# Patient Record
Sex: MALE | Race: WHITE | NOT HISPANIC OR LATINO
[De-identification: names, ages, dates, MRNs, and addresses within clinical notes are randomized per-mention and may not be internally consistent; named-entity substitution may affect disease eponyms.]

---

## 2017-09-27 PROBLEM — Z00.00 ENCOUNTER FOR PREVENTIVE HEALTH EXAMINATION: Status: ACTIVE | Noted: 2017-09-27

## 2017-09-29 ENCOUNTER — RECORD ABSTRACTING (OUTPATIENT)
Age: 57
End: 2017-09-29

## 2017-09-29 ENCOUNTER — APPOINTMENT (OUTPATIENT)
Dept: NEUROLOGY | Facility: CLINIC | Age: 57
End: 2017-09-29
Payer: COMMERCIAL

## 2017-09-29 VITALS
DIASTOLIC BLOOD PRESSURE: 72 MMHG | HEIGHT: 73 IN | BODY MASS INDEX: 40.56 KG/M2 | HEART RATE: 66 BPM | WEIGHT: 306 LBS | SYSTOLIC BLOOD PRESSURE: 124 MMHG

## 2017-09-29 VITALS — RESPIRATION RATE: 16 BRPM

## 2017-09-29 DIAGNOSIS — Z87.09 PERSONAL HISTORY OF OTHER DISEASES OF THE RESPIRATORY SYSTEM: ICD-10-CM

## 2017-09-29 DIAGNOSIS — R51 HEADACHE: ICD-10-CM

## 2017-09-29 DIAGNOSIS — Z80.9 FAMILY HISTORY OF MALIGNANT NEOPLASM, UNSPECIFIED: ICD-10-CM

## 2017-09-29 DIAGNOSIS — Z87.39 PERSONAL HISTORY OF OTHER DISEASES OF THE MUSCULOSKELETAL SYSTEM AND CONNECTIVE TISSUE: ICD-10-CM

## 2017-09-29 DIAGNOSIS — Z86.39 PERSONAL HISTORY OF OTHER ENDOCRINE, NUTRITIONAL AND METABOLIC DISEASE: ICD-10-CM

## 2017-09-29 DIAGNOSIS — Z86.69 PERSONAL HISTORY OF OTHER DISEASES OF THE NERVOUS SYSTEM AND SENSE ORGANS: ICD-10-CM

## 2017-09-29 DIAGNOSIS — Z87.891 PERSONAL HISTORY OF NICOTINE DEPENDENCE: ICD-10-CM

## 2017-09-29 DIAGNOSIS — M79.671 PAIN IN RIGHT FOOT: ICD-10-CM

## 2017-09-29 DIAGNOSIS — Z82.49 FAMILY HISTORY OF ISCHEMIC HEART DISEASE AND OTHER DISEASES OF THE CIRCULATORY SYSTEM: ICD-10-CM

## 2017-09-29 PROCEDURE — 99204 OFFICE O/P NEW MOD 45 MIN: CPT

## 2017-09-29 RX ORDER — ALBUTEROL SULFATE 90 UG/1
108 (90 BASE) AEROSOL, METERED RESPIRATORY (INHALATION)
Qty: 18 | Refills: 0 | Status: ACTIVE | COMMUNITY
Start: 2017-08-23

## 2017-09-29 RX ORDER — BUDESONIDE AND FORMOTEROL FUMARATE DIHYDRATE 160; 4.5 UG/1; UG/1
160-4.5 AEROSOL RESPIRATORY (INHALATION)
Qty: 10 | Refills: 0 | Status: ACTIVE | COMMUNITY
Start: 2017-02-28

## 2017-09-29 RX ORDER — HYDROCODONE BITARTRATE AND HOMATROPINE METHYLBROMIDE 5; 1.5 MG/5ML; MG/5ML
5-1.5 SYRUP ORAL
Qty: 120 | Refills: 0 | Status: COMPLETED | COMMUNITY
Start: 2017-07-25

## 2017-09-29 RX ORDER — CEPHALEXIN 500 MG/1
500 CAPSULE ORAL
Qty: 28 | Refills: 0 | Status: COMPLETED | COMMUNITY
Start: 2017-05-19

## 2017-11-03 ENCOUNTER — APPOINTMENT (OUTPATIENT)
Dept: NEUROLOGY | Facility: CLINIC | Age: 57
End: 2017-11-03

## 2017-11-13 ENCOUNTER — APPOINTMENT (OUTPATIENT)
Dept: MRI IMAGING | Facility: CLINIC | Age: 57
End: 2017-11-13
Payer: COMMERCIAL

## 2017-11-13 ENCOUNTER — OUTPATIENT (OUTPATIENT)
Dept: OUTPATIENT SERVICES | Facility: HOSPITAL | Age: 57
LOS: 1 days | End: 2017-11-13
Payer: COMMERCIAL

## 2017-11-13 DIAGNOSIS — R26.81 UNSTEADINESS ON FEET: ICD-10-CM

## 2017-11-13 PROCEDURE — 70551 MRI BRAIN STEM W/O DYE: CPT | Mod: 26

## 2017-11-13 PROCEDURE — 70551 MRI BRAIN STEM W/O DYE: CPT

## 2017-11-20 ENCOUNTER — APPOINTMENT (OUTPATIENT)
Dept: MRI IMAGING | Facility: CLINIC | Age: 57
End: 2017-11-20
Payer: COMMERCIAL

## 2017-11-20 ENCOUNTER — OUTPATIENT (OUTPATIENT)
Dept: OUTPATIENT SERVICES | Facility: HOSPITAL | Age: 57
LOS: 1 days | End: 2017-11-20
Payer: COMMERCIAL

## 2017-11-20 DIAGNOSIS — Z00.8 ENCOUNTER FOR OTHER GENERAL EXAMINATION: ICD-10-CM

## 2017-11-20 PROCEDURE — 72148 MRI LUMBAR SPINE W/O DYE: CPT | Mod: 26

## 2017-11-20 PROCEDURE — 72148 MRI LUMBAR SPINE W/O DYE: CPT

## 2017-11-20 PROCEDURE — 72141 MRI NECK SPINE W/O DYE: CPT

## 2017-11-20 PROCEDURE — 72141 MRI NECK SPINE W/O DYE: CPT | Mod: 26

## 2017-11-24 ENCOUNTER — OTHER (OUTPATIENT)
Age: 57
End: 2017-11-24

## 2017-12-28 ENCOUNTER — APPOINTMENT (OUTPATIENT)
Dept: NEUROLOGY | Facility: CLINIC | Age: 57
End: 2017-12-28

## 2018-01-05 ENCOUNTER — OUTPATIENT (OUTPATIENT)
Dept: OUTPATIENT SERVICES | Facility: HOSPITAL | Age: 58
LOS: 1 days | End: 2018-01-05
Payer: COMMERCIAL

## 2018-01-05 ENCOUNTER — APPOINTMENT (OUTPATIENT)
Dept: MRI IMAGING | Facility: CLINIC | Age: 58
End: 2018-01-05
Payer: COMMERCIAL

## 2018-01-05 DIAGNOSIS — Z00.8 ENCOUNTER FOR OTHER GENERAL EXAMINATION: ICD-10-CM

## 2018-01-05 PROCEDURE — A9585: CPT

## 2018-01-05 PROCEDURE — 70553 MRI BRAIN STEM W/O & W/DYE: CPT | Mod: 26

## 2018-01-05 PROCEDURE — 70553 MRI BRAIN STEM W/O & W/DYE: CPT

## 2018-02-16 ENCOUNTER — APPOINTMENT (OUTPATIENT)
Dept: NEUROLOGY | Facility: CLINIC | Age: 58
End: 2018-02-16
Payer: COMMERCIAL

## 2018-02-16 PROCEDURE — 95911 NRV CNDJ TEST 9-10 STUDIES: CPT

## 2018-02-16 PROCEDURE — 95886 MUSC TEST DONE W/N TEST COMP: CPT

## 2018-02-16 RX ORDER — SODIUM FLUORIDE 6 MG/ML
1.1 PASTE, DENTIFRICE DENTAL
Qty: 100 | Refills: 0 | Status: COMPLETED | COMMUNITY
Start: 2017-10-28

## 2018-02-16 RX ORDER — AMOXICILLIN 500 MG/1
500 CAPSULE ORAL
Qty: 21 | Refills: 0 | Status: COMPLETED | COMMUNITY
Start: 2017-10-28

## 2018-02-16 RX ORDER — AMOXICILLIN AND CLAVULANATE POTASSIUM 875; 125 MG/1; MG/1
875-125 TABLET, COATED ORAL
Qty: 20 | Refills: 0 | Status: COMPLETED | COMMUNITY
Start: 2018-02-01

## 2018-02-16 RX ORDER — METHYLPREDNISOLONE 4 MG/1
4 TABLET ORAL
Qty: 21 | Refills: 0 | Status: COMPLETED | COMMUNITY
Start: 2018-02-01

## 2018-03-19 ENCOUNTER — APPOINTMENT (OUTPATIENT)
Dept: NEUROLOGY | Facility: CLINIC | Age: 58
End: 2018-03-19
Payer: COMMERCIAL

## 2018-03-19 VITALS
WEIGHT: 304 LBS | BODY MASS INDEX: 40.29 KG/M2 | SYSTOLIC BLOOD PRESSURE: 106 MMHG | HEIGHT: 73 IN | HEART RATE: 72 BPM | DIASTOLIC BLOOD PRESSURE: 72 MMHG

## 2018-03-19 DIAGNOSIS — R20.0 ANESTHESIA OF SKIN: ICD-10-CM

## 2018-03-19 DIAGNOSIS — R20.2 ANESTHESIA OF SKIN: ICD-10-CM

## 2018-03-19 DIAGNOSIS — S16.1XXA STRAIN OF MUSCLE, FASCIA AND TENDON AT NECK LEVEL, INITIAL ENCOUNTER: ICD-10-CM

## 2018-03-19 DIAGNOSIS — G43.809 OTHER MIGRAINE, NOT INTRACTABLE, W/OUT STATUS MIGRAINOSUS: ICD-10-CM

## 2018-03-19 DIAGNOSIS — G89.29 LOW BACK PAIN: ICD-10-CM

## 2018-03-19 DIAGNOSIS — R90.89 OTHER ABNORMAL FINDINGS ON DIAGNOSTIC IMAGING OF CENTRAL NERVOUS SYSTEM: ICD-10-CM

## 2018-03-19 DIAGNOSIS — M54.5 LOW BACK PAIN: ICD-10-CM

## 2018-03-19 DIAGNOSIS — M54.41 LUMBAGO WITH SCIATICA, RIGHT SIDE: ICD-10-CM

## 2018-03-19 DIAGNOSIS — E23.7 DISORDER OF PITUITARY GLAND, UNSPECIFIED: ICD-10-CM

## 2018-03-19 DIAGNOSIS — M54.16 RADICULOPATHY, LUMBAR REGION: ICD-10-CM

## 2018-03-19 DIAGNOSIS — R26.81 UNSTEADINESS ON FEET: ICD-10-CM

## 2018-03-19 PROCEDURE — 99214 OFFICE O/P EST MOD 30 MIN: CPT

## 2018-05-25 ENCOUNTER — MOBILE ON CALL (OUTPATIENT)
Age: 58
End: 2018-05-25

## 2018-06-14 ENCOUNTER — APPOINTMENT (OUTPATIENT)
Dept: NEUROLOGY | Facility: CLINIC | Age: 58
End: 2018-06-14

## 2019-09-19 NOTE — H&P ADULT - HISTORY OF PRESENT ILLNESS
60 yo M with PMHx of arthritis, sleep apnea, had episode of chest pressure which was reproducible, diagnosed with costochondritis recently.  Pt underwent stress test which showed (+) distal IW ischemia.  Pt referred for LHC with possible intervention. 58 yo M with PMHx of arthritis, sleep apnea, had episode of chest pressure which was reproducible, diagnosed with costochondritis recently. Had a prior episode of chest pain after stressful event at work  Pt underwent stress test which showed (+) distal IW ischemia.  Pt referred for LHC with possible intervention. 58 yo M with PMHx of arthritis, sleep apnea, had episode of chest pressure which was reproducible, diagnosed with costochondritis recently. Had  prior episodes of chest pain after stressful event at work and walking.   Pt underwent stress test which showed (+) distal IW ischemia.  Pt referred for LHC with possible intervention.

## 2019-09-19 NOTE — H&P ADULT - ASSESSMENT
58 yo M with PMHx of arthritis, sleep apnea, had episode of chest pressure which was reproducible, diagnosed with costochondritis recently.  Pt underwent stress test which showed (+) distal IW ischemia.  Pt referred for LHC with possible intervention.  ASA:  Calculated bleeding risk score:  Creatinine:  GFR: 58 yo M with PMHx of arthritis, sleep apnea, had episode of chest pressure which was reproducible, diagnosed with costochondritis recently.  Pt underwent stress test which showed (+) distal IW ischemia.  Pt referred for LHC with possible intervention.  ASA:II  Calculated bleeding risk score:0.7%  Creatinine:0.86  GFR:96 60 yo M with PMHx of arthritis, sleep apnea, had episode of chest pressure which was reproducible, diagnosed with costochondritis recently.  Pt underwent stress test which showed (+) distal IW ischemia.  Pt referred for LHC with possible intervention.  Hepatitis C drawn per guideline. Pt will follow with PCP  ASA:II  Calculated bleeding risk score:0.7%  Creatinine:0.86  GFR:96 60 yo M with PMHx of arthritis, sleep apnea, had episode of chest pressure which was reproducible, diagnosed with costochondritis recently. Had a prior episode of chest pain after stressful event at work    Pt underwent stress test which showed (+) distal IW ischemia.  Pt referred for LHC with possible intervention.  Hepatitis C drawn per guideline. Pt will follow with PCP  ASA:II  Calculated bleeding risk score:0.7%  Creatinine:0.86  GFR:96 60 yo M with PMHx of arthritis, sleep apnea, had episode of chest pressure which was reproducible, diagnosed with costochondritis recently. Had prior episodes of chest pain after stressful event at work and walking   Pt underwent stress test which showed (+) distal IW ischemia.  Pt referred for LHC with possible intervention.  Hepatitis C drawn per guideline. Pt will follow with PCP  ASA:II  Calculated bleeding risk score:0.7%  Creatinine:0.86  GFR:96

## 2019-09-19 NOTE — H&P ADULT - PROBLEM SELECTOR PLAN 1
Pt is referred for Lt heart cath/possible PCI. Labs & medications are reviewed. Informed consent obtained after discussion of [facility] risks, benefits and alternatives  with patient. Risk discussed included, but not limited to MI, stroke, mortality, major bleeding, arrhythmia, or infection.  An educational material provided. Pt. verbalizes understandings of pre-procedural instructions.

## 2019-09-19 NOTE — ASU PATIENT PROFILE, ADULT - REASON FOR ADMISSION, PROFILE
I've been getting more and mores short of breath recently and had chest pain so they sent me here for a heart cath

## 2019-09-19 NOTE — H&P ADULT - NSHPREVIEWOFSYSTEMS_GEN_ALL_CORE
REVIEW OF SYSTEMS:    CONSTITUTIONAL: No weakness, fevers or chills  EYES/ENT: No visual changes;  No vertigo or throat pain   NECK: No pain or stiffness  RESPIRATORY: No cough, wheezing, hemoptysis; No shortness of breath  CARDIOVASCULAR: + chest pain. Denies palpitations  GASTROINTESTINAL: No abdominal or epigastric pain. No nausea, vomiting, or hematemesis; No diarrhea or constipation. No melena or hematochezia.  GENITOURINARY: No dysuria, frequency or hematuria  NEUROLOGICAL: No numbness or weakness  SKIN: No itching, burning, rashes, or lesions   All other review of systems is negative unless indicated above.

## 2019-09-19 NOTE — H&P ADULT - NSICDXFAMILYHX_GEN_ALL_CORE_FT
FAMILY HISTORY:  No pertinent family history in first degree relatives FAMILY HISTORY:  FHx: heart disease, father

## 2019-09-19 NOTE — H&P ADULT - NSHPPHYSICALEXAM_GEN_ALL_CORE
PHYSICAL EXAM:    Constitutional: NAD, well-groomed, well-developed  Neuro: Alert and oriented x 3 Gait steady Speech clear No focal deficits  Neck: No JVD No bruit  Respiratory: CTAB  Cardiovascular: S1 and S2, RRR,   Gastrointestinal: BS+, soft, NT/ND  Extremities: No clubbing cyanosis or edema No varicosities  Vascular: 2+ peripheral pulses  Psychiatric: Normal mood, normal affect  Musculoskeletal: 5/5 strength b/l upper and lower extremities PHYSICAL EXAM:    Vital Signs Last 24 Hrs  T(C): 35.9 (20 Sep 2019 07:51), Max: 35.9 (20 Sep 2019 07:51)  T(F): 96.7 (20 Sep 2019 07:51), Max: 96.7 (20 Sep 2019 07:51)  HR: 68 (20 Sep 2019 07:51) (68 - 68)  BP: 120/81 (20 Sep 2019 07:51) (120/81 - 120/81)  RR: 16 (20 Sep 2019 07:51) (16 - 16)  SpO2: 95% (20 Sep 2019 07:51) (95% - 95%)    Constitutional: NAD, well-groomed, well-developed  Neuro: Alert and oriented x 3 Gait steady Speech clear No focal deficits  Neck: No JVD No bruit  Respiratory: CTAB  Cardiovascular: S1 and S2, RRR,   Gastrointestinal: BS+, soft, NT/ND  Extremities: No clubbing cyanosis or edema No varicosities  Vascular: 2+ peripheral pulses  Psychiatric: Normal mood, normal affect  Musculoskeletal: 5/5 strength b/l upper and lower extremities

## 2019-09-20 ENCOUNTER — OUTPATIENT (OUTPATIENT)
Dept: OUTPATIENT SERVICES | Facility: HOSPITAL | Age: 59
LOS: 1 days | Discharge: HOME | End: 2019-09-20
Payer: COMMERCIAL

## 2019-09-20 VITALS
HEART RATE: 68 BPM | TEMPERATURE: 97 F | OXYGEN SATURATION: 95 % | DIASTOLIC BLOOD PRESSURE: 81 MMHG | RESPIRATION RATE: 16 BRPM | SYSTOLIC BLOOD PRESSURE: 120 MMHG

## 2019-09-20 VITALS
OXYGEN SATURATION: 99 % | RESPIRATION RATE: 18 BRPM | SYSTOLIC BLOOD PRESSURE: 123 MMHG | DIASTOLIC BLOOD PRESSURE: 70 MMHG | HEART RATE: 68 BPM

## 2019-09-20 DIAGNOSIS — R07.2 PRECORDIAL PAIN: ICD-10-CM

## 2019-09-20 DIAGNOSIS — R94.39 ABNORMAL RESULT OF OTHER CARDIOVASCULAR FUNCTION STUDY: ICD-10-CM

## 2019-09-20 LAB
HCV AB S/CO SERPL IA: 0.09 S/CO — SIGNIFICANT CHANGE UP (ref 0–0.99)
HCV AB SERPL-IMP: SIGNIFICANT CHANGE UP

## 2019-09-20 PROCEDURE — C1887: CPT

## 2019-09-20 PROCEDURE — C1769: CPT

## 2019-09-20 PROCEDURE — C1760: CPT

## 2019-09-20 PROCEDURE — C1894: CPT

## 2019-09-20 PROCEDURE — 86803 HEPATITIS C AB TEST: CPT

## 2019-09-20 PROCEDURE — 93458 L HRT ARTERY/VENTRICLE ANGIO: CPT

## 2019-09-20 PROCEDURE — 36415 COLL VENOUS BLD VENIPUNCTURE: CPT

## 2019-09-20 RX ORDER — ASPIRIN/CALCIUM CARB/MAGNESIUM 324 MG
1 TABLET ORAL
Qty: 0 | Refills: 0 | DISCHARGE

## 2019-09-20 RX ORDER — BUDESONIDE AND FORMOTEROL FUMARATE DIHYDRATE 160; 4.5 UG/1; UG/1
2 AEROSOL RESPIRATORY (INHALATION)
Qty: 0 | Refills: 0 | DISCHARGE

## 2019-09-20 NOTE — PACU DISCHARGE NOTE - COMMENTS
Pt. and wife verb. agreement and understanding to and teach back to written and verbal discharge instructions and medication list and MD follow up.  Pt. discharged to home via private auto accompanied by wife; escorted to auto via w/c by transport tech.

## 2019-09-20 NOTE — PROGRESS NOTE ADULT - SUBJECTIVE AND OBJECTIVE BOX
s/p LHC revealing non obstructive CAD.  Denies chest pain, shortness of breath, dizziness or palpitations at this time    Right groin procedure site CDI.  no bleeding, no hematoma, site soft, non tender, positive pedal pulses bilaterally         HPI:  60 yo M with PMHx of arthritis, sleep apnea, had episode of chest pressure which was reproducible, diagnosed with costochondritis recently.  Pt underwent stress test which showed (+) distal IW ischemia.  Pt referred for LHC with possible intervention. (19 Sep 2019 14:56)    now s/p LHC revealing non obstructive CAD    --vital signs, labs, diet and activity per post procedure orders  -ambulte ad geeta post bedrest  -iv hydration  -encourage PO fluids  -plan of care discussed with patient, family and MD  -d/c after bedrest if stable  -post procedure and d/c instructions reviewed  -follow up with MD in 1-2 weeks  -Discussed therapeutic lifestyle changes to reduce risk factors such as following a cardiac diet, weight loss, maintaining a healthy weight, exercise, smoking cessation, medication compliance, and regular follow-up  with MD to know your numbers (BP, cholesterol, weight, and glucose) s/p LHC revealing non obstructive CAD.  Denies chest pain, shortness of breath, dizziness or palpitations at this time    A+Ox3  CV:S1S2  Respiratory:CTAB  Right groin procedure site CDI.  no bleeding, no hematoma, site soft, non tender, positive pedal pulses bilaterally         HPI:  58 yo M with PMHx of arthritis, sleep apnea, had episode of chest pressure which was reproducible, diagnosed with costochondritis recently. Had prior  episode of chest pain after stressful event at work   Pt underwent stress test which showed (+) distal IW ischemia.  Pt referred for LHC with possible intervention. (19 Sep 2019 14:56)    now s/p LHC revealing non obstructive CAD    --vital signs, diet and activity per post procedure orders  -ambulate ad geeta post bedrest  -encourage PO fluids  -plan of care discussed with patient, family and MD  -d/c after bedrest if stable  -post procedure and d/c instructions reviewed  -follow up with MD in 1-2 weeks  -Discussed therapeutic lifestyle changes to reduce risk factors such as following a cardiac diet, weight loss, maintaining a healthy weight, exercise, smoking cessation, medication compliance, and regular follow-up  with MD

## 2019-09-22 ENCOUNTER — TRANSCRIPTION ENCOUNTER (OUTPATIENT)
Age: 59
End: 2019-09-22

## 2019-09-25 DIAGNOSIS — R94.39 ABNORMAL RESULT OF OTHER CARDIOVASCULAR FUNCTION STUDY: ICD-10-CM

## 2019-09-25 DIAGNOSIS — E78.00 PURE HYPERCHOLESTEROLEMIA, UNSPECIFIED: ICD-10-CM

## 2019-09-25 DIAGNOSIS — I20.8 OTHER FORMS OF ANGINA PECTORIS: ICD-10-CM

## 2019-09-25 DIAGNOSIS — I10 ESSENTIAL (PRIMARY) HYPERTENSION: ICD-10-CM

## 2019-12-09 ENCOUNTER — LABORATORY RESULT (OUTPATIENT)
Age: 59
End: 2019-12-09

## 2019-12-09 ENCOUNTER — APPOINTMENT (OUTPATIENT)
Dept: DERMATOLOGY | Facility: CLINIC | Age: 59
End: 2019-12-09
Payer: COMMERCIAL

## 2019-12-09 DIAGNOSIS — D22.9 MELANOCYTIC NEVI, UNSPECIFIED: ICD-10-CM

## 2019-12-09 DIAGNOSIS — L91.8 OTHER HYPERTROPHIC DISORDERS OF THE SKIN: ICD-10-CM

## 2019-12-09 DIAGNOSIS — D48.9 NEOPLASM OF UNCERTAIN BEHAVIOR, UNSPECIFIED: ICD-10-CM

## 2019-12-09 DIAGNOSIS — Z12.83 ENCOUNTER FOR SCREENING FOR MALIGNANT NEOPLASM OF SKIN: ICD-10-CM

## 2019-12-09 PROCEDURE — 99243 OFF/OP CNSLTJ NEW/EST LOW 30: CPT | Mod: 25

## 2019-12-09 PROCEDURE — 11104 PUNCH BX SKIN SINGLE LESION: CPT

## 2019-12-09 RX ORDER — IBUPROFEN 200 MG/1
200 TABLET, COATED ORAL
Refills: 0 | Status: DISCONTINUED | COMMUNITY
End: 2019-12-09

## 2019-12-09 RX ORDER — MONTELUKAST 10 MG/1
10 TABLET, FILM COATED ORAL
Qty: 30 | Refills: 0 | Status: DISCONTINUED | COMMUNITY
Start: 2017-07-24 | End: 2019-12-09

## 2019-12-16 ENCOUNTER — APPOINTMENT (OUTPATIENT)
Dept: DERMATOLOGY | Facility: CLINIC | Age: 59
End: 2019-12-16
Payer: COMMERCIAL

## 2019-12-16 DIAGNOSIS — Z48.02 ENCOUNTER FOR REMOVAL OF SUTURES: ICD-10-CM

## 2019-12-16 LAB — CORE LAB BIOPSY: NORMAL

## 2019-12-16 PROCEDURE — 99024 POSTOP FOLLOW-UP VISIT: CPT

## 2021-05-19 ENCOUNTER — OUTPATIENT (OUTPATIENT)
Dept: OUTPATIENT SERVICES | Facility: HOSPITAL | Age: 61
LOS: 1 days | End: 2021-05-19
Payer: COMMERCIAL

## 2021-05-19 ENCOUNTER — APPOINTMENT (OUTPATIENT)
Dept: CT IMAGING | Facility: CLINIC | Age: 61
End: 2021-05-19
Payer: COMMERCIAL

## 2021-05-19 DIAGNOSIS — Z00.8 ENCOUNTER FOR OTHER GENERAL EXAMINATION: ICD-10-CM

## 2021-05-19 DIAGNOSIS — K40.90 UNILATERAL INGUINAL HERNIA, WITHOUT OBSTRUCTION OR GANGRENE, NOT SPECIFIED AS RECURRENT: ICD-10-CM

## 2021-05-19 PROCEDURE — 74176 CT ABD & PELVIS W/O CONTRAST: CPT

## 2021-05-19 PROCEDURE — 74176 CT ABD & PELVIS W/O CONTRAST: CPT | Mod: 26

## 2022-05-24 ENCOUNTER — APPOINTMENT (OUTPATIENT)
Dept: DERMATOLOGY | Facility: CLINIC | Age: 62
End: 2022-05-24
Payer: COMMERCIAL

## 2022-05-24 DIAGNOSIS — L82.1 OTHER SEBORRHEIC KERATOSIS: ICD-10-CM

## 2022-05-24 DIAGNOSIS — L81.4 OTHER MELANIN HYPERPIGMENTATION: ICD-10-CM

## 2022-05-24 DIAGNOSIS — L72.3 SEBACEOUS CYST: ICD-10-CM

## 2022-05-24 DIAGNOSIS — D18.01 HEMANGIOMA OF SKIN AND SUBCUTANEOUS TISSUE: ICD-10-CM

## 2022-05-24 PROCEDURE — 99214 OFFICE O/P EST MOD 30 MIN: CPT

## 2022-05-24 NOTE — HISTORY OF PRESENT ILLNESS
[de-identified] : Pt. presents for skin check;\par c/o few spots of concern;  c/o painful lesion on back; \par Severity:  mild  \par Modifying factors:  none\par Associated symptoms:  none\par Context:  no association with activity

## 2022-05-24 NOTE — ASSESSMENT
[FreeTextEntry1] : Complete skin examination is negative for malignancy; Multiple new concerns were addressed and discussed.\par Therapeutic options and their risks and benefits; along with multiple diagnostic possibilities were discussed at length;\par risks and benefits of skin biopsy and/or other further study were discussed;\par \par Continue regular exams; Follow up for TBSE in 1 year\par \par ruptured cyst;  s/p punch removal in past, now recurrent; not severe;\par f/u for surgical removal;  The risks and benefits of the procedure, were explained at length including pain; scar, bleeding; recurrence of lesion(s); need for wound care;\par  \par will schedule after summer

## 2022-05-24 NOTE — PHYSICAL EXAM
[Full Body Skin Exam Performed] : performed [FreeTextEntry3] : Upper body skin examination performed of the face, neck, trunk, arms;\par The patient is well, alert and oriented, pleasant and cooperative.\par Eyelids, conjunctivae, oral mucosa, digits and nails all normal.  \par No cervical adenopathy.\par \par Normal findings include:\par Seborrheic keratoses- back\par Angiomas\par Lentigines\par + tender, fluctuant subcutaneous nodule - within scar;  mid back; \par \par No findings were suspicious for malignancy.

## 2022-09-13 ENCOUNTER — APPOINTMENT (OUTPATIENT)
Dept: DERMATOLOGY | Facility: CLINIC | Age: 62
End: 2022-09-13

## 2023-08-28 ENCOUNTER — OFFICE (OUTPATIENT)
Dept: URBAN - METROPOLITAN AREA CLINIC 102 | Facility: CLINIC | Age: 63
Setting detail: OPHTHALMOLOGY
End: 2023-08-28
Payer: COMMERCIAL

## 2023-08-28 DIAGNOSIS — D31.30: ICD-10-CM

## 2023-08-28 DIAGNOSIS — H52.4: ICD-10-CM

## 2023-08-28 DIAGNOSIS — D22.122: ICD-10-CM

## 2023-08-28 DIAGNOSIS — E11.9: ICD-10-CM

## 2023-08-28 DIAGNOSIS — H25.13: ICD-10-CM

## 2023-08-28 PROCEDURE — 92014 COMPRE OPH EXAM EST PT 1/>: CPT | Performed by: OPHTHALMOLOGY

## 2023-08-28 PROCEDURE — 92020 GONIOSCOPY: CPT | Performed by: OPHTHALMOLOGY

## 2023-08-28 PROCEDURE — 92015 DETERMINE REFRACTIVE STATE: CPT | Performed by: OPHTHALMOLOGY

## 2023-08-28 ASSESSMENT — REFRACTION_CURRENTRX
OD_CYLINDER: -2.00
OD_CYLINDER: -2.00
OD_AXIS: 033
OS_CYLINDER: -1.25
OD_VPRISM_DIRECTION: SV
OS_CYLINDER: -1.25
OS_SPHERE: +0.75
OD_AXIS: 029
OS_ADD: +1.00
OD_OVR_VA: 20/
OS_OVR_VA: 20/
OD_ADD: +1.00
OD_OVR_VA: 20/
OS_AXIS: 008
OD_VPRISM_DIRECTION: SV
OD_CYLINDER: -2.00
OS_CYLINDER: -1.25
OS_VPRISM_DIRECTION: SV
OD_AXIS: 032
OS_AXIS: 007
OS_OVR_VA: 20/
OD_SPHERE: +0.25
OS_SPHERE: -1.25
OS_OVR_VA: 20/
OS_AXIS: 004
OS_SPHERE: -0.25
OD_OVR_VA: 20/
OS_VPRISM_DIRECTION: SV
OD_SPHERE: -0.75
OS_VPRISM_DIRECTION: BF
OD_SPHERE: -1.75
OD_VPRISM_DIRECTION: BF

## 2023-08-28 ASSESSMENT — REFRACTION_AUTOREFRACTION
OD_CYLINDER: -1.75
OS_SPHERE: -1.00
OS_CYLINDER: -1.00
OD_AXIS: 029
OS_AXIS: 168
OD_SPHERE: -1.50

## 2023-08-28 ASSESSMENT — REFRACTION_MANIFEST
OU_VA: 20/20
OD_VA1: 20/20
OS_AXIS: 180
OS_SPHERE: -1.25
OS_CYLINDER: -0.75
OS_VA1: 20/20
OD_SPHERE: -1.75
OD_CYLINDER: -1.75
OD_ADD: +2.25
OD_AXIS: 030
OS_ADD: +2.25

## 2023-08-28 ASSESSMENT — VISUAL ACUITY
OS_BCVA: 20/25
OD_BCVA: 20/25

## 2023-08-28 ASSESSMENT — SPHEQUIV_DERIVED
OS_SPHEQUIV: -1.625
OD_SPHEQUIV: -2.375
OD_SPHEQUIV: -2.625
OS_SPHEQUIV: -1.5

## 2023-08-28 ASSESSMENT — KERATOMETRY
METHOD_AUTO_MANUAL: AUTO
OD_K1POWER_DIOPTERS: 42.50
OS_K1POWER_DIOPTERS: 42.75
OD_AXISANGLE_DEGREES: 113
OD_K2POWER_DIOPTERS: 44.00
OS_AXISANGLE_DEGREES: 091
OS_K2POWER_DIOPTERS: 43.50

## 2023-08-28 ASSESSMENT — AXIALLENGTH_DERIVED
OS_AL: 24.3369
OD_AL: 24.7617
OD_AL: 24.6549
OS_AL: 24.3888

## 2023-08-28 ASSESSMENT — TONOMETRY
OS_IOP_MMHG: 15
OD_IOP_MMHG: 14

## 2023-08-28 ASSESSMENT — CONFRONTATIONAL VISUAL FIELD TEST (CVF)
OS_FINDINGS: FULL
OD_FINDINGS: FULL

## 2024-08-30 ENCOUNTER — OFFICE (OUTPATIENT)
Dept: URBAN - METROPOLITAN AREA CLINIC 102 | Facility: CLINIC | Age: 64
Setting detail: OPHTHALMOLOGY
End: 2024-08-30
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
## 2024-08-30 PROCEDURE — NO SHOW FE NO SHOW FEE: Performed by: OPHTHALMOLOGY
